# Patient Record
Sex: MALE | Race: NATIVE HAWAIIAN OR OTHER PACIFIC ISLANDER
[De-identification: names, ages, dates, MRNs, and addresses within clinical notes are randomized per-mention and may not be internally consistent; named-entity substitution may affect disease eponyms.]

---

## 2022-12-03 ENCOUNTER — HOSPITAL ENCOUNTER (EMERGENCY)
Dept: HOSPITAL 67 - ED | Age: 67
LOS: 1 days | Discharge: STILL ADMITTED OR ADMINISTRATIVE | End: 2022-12-04
Payer: COMMERCIAL

## 2022-12-03 VITALS — BODY MASS INDEX: 28.7 KG/M2 | HEIGHT: 71 IN | WEIGHT: 205 LBS

## 2022-12-03 VITALS — TEMPERATURE: 100.3 F

## 2022-12-03 DIAGNOSIS — R46.89: Primary | ICD-10-CM

## 2022-12-03 DIAGNOSIS — Z20.822: ICD-10-CM

## 2022-12-03 DIAGNOSIS — N18.9: ICD-10-CM

## 2022-12-03 DIAGNOSIS — Z11.52: ICD-10-CM

## 2022-12-03 LAB
PLATELET # BLD: 166 K/UL (ref 142–355)
POTASSIUM SERPL-SCNC: 4.2 MMOL/L (ref 3.6–5.2)
SODIUM SERPL-SCNC: 134 MMOL/L (ref 136–145)

## 2022-12-03 PROCEDURE — 80053 COMPREHEN METABOLIC PANEL: CPT

## 2022-12-03 PROCEDURE — 96360 HYDRATION IV INFUSION INIT: CPT

## 2022-12-03 PROCEDURE — 96365 THER/PROPH/DIAG IV INF INIT: CPT

## 2022-12-03 PROCEDURE — 99284 EMERGENCY DEPT VISIT MOD MDM: CPT

## 2022-12-03 PROCEDURE — 87635 SARS-COV-2 COVID-19 AMP PRB: CPT

## 2022-12-03 PROCEDURE — 85027 COMPLETE CBC AUTOMATED: CPT

## 2022-12-03 PROCEDURE — 96372 THER/PROPH/DIAG INJ SC/IM: CPT

## 2022-12-03 PROCEDURE — 93005 ELECTROCARDIOGRAM TRACING: CPT

## 2022-12-03 PROCEDURE — U0003 INFECTIOUS AGENT DETECTION BY NUCLEIC ACID (DNA OR RNA); SEVERE ACUTE RESPIRATORY SYNDROME CORONAVIRUS 2 (SARS-COV-2) (CORONAVIRUS DISEASE [COVID-19]), AMPLIFIED PROBE TECHNIQUE, MAKING USE OF HIGH THROUGHPUT TECHNOLOGIES AS DESCRIBED BY CMS-2020-01-R: HCPCS

## 2022-12-04 VITALS — DIASTOLIC BLOOD PRESSURE: 48 MMHG | SYSTOLIC BLOOD PRESSURE: 118 MMHG

## 2023-03-03 ENCOUNTER — OFFICE VISIT (OUTPATIENT)
Dept: URBAN - METROPOLITAN AREA CLINIC 113 | Facility: CLINIC | Age: 68
End: 2023-03-03
Payer: MEDICARE

## 2023-03-03 ENCOUNTER — WEB ENCOUNTER (OUTPATIENT)
Dept: URBAN - METROPOLITAN AREA CLINIC 113 | Facility: CLINIC | Age: 68
End: 2023-03-03

## 2023-03-03 ENCOUNTER — DASHBOARD ENCOUNTERS (OUTPATIENT)
Age: 68
End: 2023-03-03

## 2023-03-03 DIAGNOSIS — K76.0 FATTY LIVER: ICD-10-CM

## 2023-03-03 DIAGNOSIS — R63.4 WEIGHT LOSS: ICD-10-CM

## 2023-03-03 DIAGNOSIS — R74.8 ELEVATED LIVER ENZYMES: ICD-10-CM

## 2023-03-03 DIAGNOSIS — D69.6 THROMBOCYTOPENIA: ICD-10-CM

## 2023-03-03 PROCEDURE — 99204 OFFICE O/P NEW MOD 45 MIN: CPT | Performed by: NURSE PRACTITIONER

## 2023-03-03 RX ORDER — ACETAMINOPHEN 325 MG
2 TABLET ORAL
Status: ACTIVE | COMMUNITY

## 2023-03-03 RX ORDER — MIRTAZAPINE 30 MG/1
1 TABLET AT BEDTIME TABLET, FILM COATED ORAL ONCE A DAY
Status: ACTIVE | COMMUNITY

## 2023-03-03 RX ORDER — MAGNESIUM OXIDE 400 MG/1
1 TABLET TABLET ORAL TWICE A DAY
Status: ACTIVE | COMMUNITY

## 2023-03-03 RX ORDER — TRAZODONE HYDROCHLORIDE 50 MG/1
1 TABLET AT BEDTIME AS NEEDED TABLET ORAL ONCE A DAY
Status: ACTIVE | COMMUNITY

## 2023-03-03 RX ORDER — TAMSULOSIN HYDROCHLORIDE 0.4 MG/1
2 CAPSULES CAPSULE ORAL ONCE A DAY
Status: ACTIVE | COMMUNITY

## 2023-03-03 RX ORDER — PANTOPRAZOLE SODIUM 40 MG/1
1 TABLET TABLET, DELAYED RELEASE ORAL ONCE A DAY
Status: ACTIVE | COMMUNITY

## 2023-03-03 NOTE — HPI-TODAY'S VISIT:
This is a 67-year-old male with a history of hyperlipidemia, hypertension, CVA x2, dementia, MI, alcohol abuse, liver enzyme elevation, fatty liver, and possible cirrhosis who is currently a resident at  [facility] presenting for evaluation of weight loss and possible cirrhosis. He is accompanied by 2 family members.  He is limited in verbal response.  He had a CVA in 2020.  He has a history of daily alcohol use.  He was admitted to [facility] in late September for a CVA and MI.  He was discharged in late November to rehab and was admitted to the hospital in [place] in December because of a very high bilirubin.  He was previously under the care of Dr. Tompkins.  He was last seen there by a PA in July 2022 for liver enzyme elevation evaluation.  He had an ultrasound and a gastric emptying study at [facility] ordered by Dr. Tompkins in February 2022.  He has had colonoscopies with him as well. His food is pureed and he is receiving nutritional supplements.  He seems to have no difficulty eating and his appetite is improving somewhat.  He has lost 65 pounds since his stroke last year.  His bowels are moving regularly.  There is no blood in his stool.  He has no black stools. His family members mentioned that palliative care is being discussed.

## 2023-03-03 NOTE — PHYSICAL EXAM CARDIOVASCULAR:
trace BLE edema,  no murmurs,  regular rate and rhythm , no edema,  no murmurs,  regular rate and rhythm

## 2023-03-03 NOTE — HPI-OTHER HISTORIES
Labs 1/13/2023:BMP normal with exception of chloride 109, creatinine 1.59, albumin 2.8.  LFTs: TB 0.5, ALP 88, ALT 9, AST 13.  CBC: WBC 5.8, hemoglobin 10.8, MCV 92, platelet 220.  Hemoglobin A1c 6.2. 12/21/2022 handwritten note indicates hemoglobin 10.9.  BMP normal with exception of creatinine 1.31, albumin 3.0.  LFTs: TB 0.9, , ALT 15, AST 23. CT abdomen and pelvis with contrast 10/5/2022:Hepatomegaly with hepatic steatosis.  Fluid opacification throughout the colon suggesting possible nonspecific enteritis/diarrheal illness.  Diffuse urinary bladder wall thickening, nonspecific and possibly related to cystitis or chronic outlet obstruction.  Aortic and coronary calcifications.  Bilateral segmental atelectasis of the lungs.

## 2023-03-03 NOTE — PHYSICAL EXAM GASTROINTESTINAL
Abdomen , soft, nontender, nondistended , no guarding or rigidity , no masses palpable , normal bowel sounds; exam limited due to patient position

## 2023-03-04 LAB
A/G RATIO: 0.8
ABSOLUTE BASOPHILS: 59
ABSOLUTE EOSINOPHILS: 103
ABSOLUTE LYMPHOCYTES: 886
ABSOLUTE MONOCYTES: 535
ABSOLUTE NEUTROPHILS: 3818
ALBUMIN: 3.1
ALKALINE PHOSPHATASE: 79
ALT (SGPT): 8
AST (SGOT): 13
BASOPHILS: 1.1
BILIRUBIN, TOTAL: 0.4
BUN/CREATININE RATIO: 19
BUN: 29
CALCIUM: 8.9
CARBON DIOXIDE, TOTAL: 26
CHLORIDE: 102
CREATININE: 1.49
EGFR: 51
EOSINOPHILS: 1.9
GLOBULIN, TOTAL: 3.7
GLUCOSE: 103
HEMATOCRIT: 28.4
HEMOGLOBIN: 9.5
INR: 1
LYMPHOCYTES: 16.4
MCH: 29.4
MCHC: 33.5
MCV: 87.9
MONOCYTES: 9.9
MPV: 10.8
NEUTROPHILS: 70.7
PLATELET COUNT: 256
POTASSIUM: 3.8
PROTEIN, TOTAL: 6.8
PT: 10.3
RDW: 13.8
RED BLOOD CELL COUNT: 3.23
SODIUM: 137
WHITE BLOOD CELL COUNT: 5.4

## 2023-03-05 PROBLEM — 89362005: Status: ACTIVE | Noted: 2023-03-03

## 2023-03-05 PROBLEM — 707724006: Status: ACTIVE | Noted: 2023-03-03

## 2023-03-05 PROBLEM — 197321007: Status: ACTIVE | Noted: 2023-03-03

## 2023-03-05 PROBLEM — 302215000: Status: ACTIVE | Noted: 2023-03-03

## 2023-06-13 ENCOUNTER — OFFICE VISIT (OUTPATIENT)
Dept: URBAN - METROPOLITAN AREA CLINIC 113 | Facility: CLINIC | Age: 68
End: 2023-06-13